# Patient Record
Sex: FEMALE | Race: WHITE | Employment: FULL TIME | URBAN - NONMETROPOLITAN AREA
[De-identification: names, ages, dates, MRNs, and addresses within clinical notes are randomized per-mention and may not be internally consistent; named-entity substitution may affect disease eponyms.]

---

## 2022-10-03 ENCOUNTER — HOSPITAL ENCOUNTER (EMERGENCY)
Age: 22
Discharge: HOME | End: 2022-10-03
Attending: EMERGENCY MEDICINE
Payer: COMMERCIAL

## 2022-10-03 VITALS
OXYGEN SATURATION: 98 % | HEIGHT: 66 IN | TEMPERATURE: 98.3 F | WEIGHT: 183 LBS | HEART RATE: 70 BPM | DIASTOLIC BLOOD PRESSURE: 84 MMHG | RESPIRATION RATE: 16 BRPM | BODY MASS INDEX: 29.41 KG/M2 | SYSTOLIC BLOOD PRESSURE: 153 MMHG

## 2022-10-03 DIAGNOSIS — T88.7XXA DRUG SIDE EFFECTS: Primary | ICD-10-CM

## 2022-10-03 PROCEDURE — 99282 EMERGENCY DEPT VISIT SF MDM: CPT

## 2022-10-03 RX ORDER — PREDNISONE 20 MG/1
20 TABLET ORAL DAILY
COMMUNITY

## 2022-10-03 ASSESSMENT — PAIN - FUNCTIONAL ASSESSMENT
PAIN_FUNCTIONAL_ASSESSMENT: NONE - DENIES PAIN
PAIN_FUNCTIONAL_ASSESSMENT: NONE - DENIES PAIN

## 2022-10-04 NOTE — DISCHARGE INSTRUCTIONS
So I would stop taking the prednisone if you do not need it. As I think this may be a side effect to that medication. If you are still having symptoms I would use cough and cold formula over-the-counter.   Follow-up with your primary care physician as well

## 2022-10-04 NOTE — ED PROVIDER NOTES
Emergency Department Attending Note    Nemesio Llanos MD    Date of ED VIsit: 10/3/2022    CHIEF COMPLAINT  Allergic Reaction (C/O allergic reaction to prednisone)      HISTORY OF PRESENT ILLNESS  Jameel Erickson is a 23 y.o. female  With Vital signs of BP (!) 153/84   Pulse 70   Temp 98.3 °F (36.8 °C) (Oral)   Resp 16   Ht 5' 6\" (1.676 m)   Wt 183 lb (83 kg)   LMP 09/21/2022 (Exact Date)   SpO2 98%   BMI 29.54 kg/m²  who presents to the ED with a complaint of what she thinks is an allergic reaction to prednisone. Patient seen and evaluated in room 3. Patient was seen at the urgent care today for a cold and was given prednisone. She took the first dose of prednisone and she says that she felt her tongue get numb but she never had any shortness of breath or sensation of full throat. No abdominal pain no chest pain no shortness of breath as well no rash. .  No other complaints, modifying factors or associated symptoms. Patients Past medical history reviewed and listed below  History reviewed. No pertinent past medical history. Past Surgical History:   Procedure Laterality Date    CHOLECYSTECTOMY         I have reviewed the following from the nursing documentation. History reviewed. No pertinent family history.   Social History     Socioeconomic History    Marital status: Single     Spouse name: Not on file    Number of children: Not on file    Years of education: Not on file    Highest education level: Not on file   Occupational History    Not on file   Tobacco Use    Smoking status: Never    Smokeless tobacco: Never   Substance and Sexual Activity    Alcohol use: No    Drug use: Not on file    Sexual activity: Not on file   Other Topics Concern    Not on file   Social History Narrative    Not on file     Social Determinants of Health     Financial Resource Strain: Not on file   Food Insecurity: Not on file   Transportation Needs: Not on file   Physical Activity: Not on file   Stress: Not on file   Social Connections: Not on file   Intimate Partner Violence: Not on file   Housing Stability: Not on file     No current facility-administered medications for this encounter. Current Outpatient Medications   Medication Sig Dispense Refill    predniSONE (DELTASONE) 20 MG tablet Take 20 mg by mouth daily       No Known Allergies    REVIEW OF SYSTEMS  10 systems reviewed, pertinent positives per HPI otherwise noted to be negative     PHYSICAL EXAM  BP (!) 153/84   Pulse 70   Temp 98.3 °F (36.8 °C) (Oral)   Resp 16   Ht 5' 6\" (1.676 m)   Wt 183 lb (83 kg)   LMP 09/21/2022 (Exact Date)   SpO2 98%   BMI 29.54 kg/m²   GENERAL APPEARANCE: Awake and alert. Cooperative. In no obvious distress. HEAD: Normocephalic. Atraumatic. EYES: PERRL. EOM's grossly intact. ENT: Mucous membranes are pink and moist.  Patient's airway is wide open. There is no swelling in the posterior pharynx. On auscultation she is got good air movement through the trachea  NECK: Supple. HEART: RRR. No murmurs. LUNGS: Respirations unlabored. CTAB. Good air exchange. ABDOMEN: Soft. Non-distended. Non-tender. No masses. No organomegaly. No guarding or rebound. EXTREMITIES: No peripheral edema. Moves all extremities equally. All extremities neurovascularly intact. SKIN: Warm and dry. No acute rashes. NEUROLOGICAL: Alert and oriented. Strength 5/5, sensation intact. Gait normal.   PSYCHIATRIC: Normal mood and affect. No HI or SI expressed to me. RADIOLOGY    If acquired see below     EKG:     If acquired see below       ED COURSE/MDM    Patient looks fine and I do not think that she had an allergic reaction even though she did take a Benadryl. I think this might be just a side effect of the medication itself. So I offered the patient if she wanted to take it again she said she probably was not going to take it again which is fine. So I advised her to get some cough and cold formula for the cold.          The ED course and plan were reviewed and results discussed with the patient    The patient understood and agreed with the Discharge/transfer planning.     CLINICAL IMPRESSION and DISPOSITION    Verónica Markham was stable and diagnosed with side effects to medication     Kalli Aguirre MD  10/03/22 [id number]